# Patient Record
Sex: MALE | Race: WHITE | Employment: FULL TIME | URBAN - METROPOLITAN AREA
[De-identification: names, ages, dates, MRNs, and addresses within clinical notes are randomized per-mention and may not be internally consistent; named-entity substitution may affect disease eponyms.]

---

## 2020-02-17 ENCOUNTER — HOSPITAL ENCOUNTER (EMERGENCY)
Age: 65
Discharge: HOME | End: 2020-02-17
Attending: EMERGENCY MEDICINE
Payer: COMMERCIAL

## 2020-02-17 VITALS
BODY MASS INDEX: 26.03 KG/M2 | HEIGHT: 66 IN | TEMPERATURE: 97.8 F | DIASTOLIC BLOOD PRESSURE: 90 MMHG | WEIGHT: 162 LBS | OXYGEN SATURATION: 96 % | HEART RATE: 87 BPM | SYSTOLIC BLOOD PRESSURE: 131 MMHG | RESPIRATION RATE: 18 BRPM

## 2020-02-17 PROCEDURE — 99282 EMERGENCY DEPT VISIT SF MDM: CPT

## 2020-02-17 PROCEDURE — 6370000000 HC RX 637 (ALT 250 FOR IP): Performed by: STUDENT IN AN ORGANIZED HEALTH CARE EDUCATION/TRAINING PROGRAM

## 2020-02-17 RX ORDER — DOXYCYCLINE HYCLATE 100 MG
100 TABLET ORAL ONCE
Status: COMPLETED | OUTPATIENT
Start: 2020-02-17 | End: 2020-02-17

## 2020-02-17 RX ORDER — DOXYCYCLINE 100 MG/1
100 TABLET ORAL 2 TIMES DAILY
Qty: 28 TABLET | Refills: 0 | Status: SHIPPED | OUTPATIENT
Start: 2020-02-17 | End: 2020-03-02

## 2020-02-17 RX ADMIN — DOXYCYCLINE HYCLATE 100 MG: 100 TABLET, COATED ORAL at 15:07

## 2020-02-17 ASSESSMENT — PAIN DESCRIPTION - PROGRESSION: CLINICAL_PROGRESSION: NOT CHANGED

## 2020-02-17 ASSESSMENT — PAIN DESCRIPTION - PAIN TYPE: TYPE: ACUTE PAIN

## 2020-02-17 ASSESSMENT — PAIN DESCRIPTION - FREQUENCY: FREQUENCY: INTERMITTENT

## 2020-02-17 ASSESSMENT — PAIN DESCRIPTION - LOCATION: LOCATION: FACE;MOUTH

## 2020-02-17 ASSESSMENT — PAIN SCALES - GENERAL: PAINLEVEL_OUTOF10: 5

## 2020-02-17 ASSESSMENT — PAIN DESCRIPTION - DESCRIPTORS: DESCRIPTORS: ACHING;DISCOMFORT

## 2020-02-17 NOTE — ED PROVIDER NOTES
[address] [facility]  Emergency Department Encounter  Emergency Medicine Resident     Pt Name: Manuel Mcdonald. MRN: [id number]  Birthdate 1955  Date of evaluation: 2/17/20  PCP:  Megan Mcpherson MD    CHIEF COMPLAINT     No chief complaint on file. HISTORY OF PRESENT ILLNESS  (Location/Symptom, Timing/Onset, Context/Setting, Quality, Duration, Modifying Factors, Severity.)    Manuel Mcdonald is a 59 y.o. male who presents with infection of upper lip with associated swelling this is been present for the past 3 days he has been taking Keflex which she received in urgent care 2 days prior. Was sent here by his primary care doctor due to concern for this infection. Denies any vision changes, hearing changes, altered sensorium, difficulty swallowing trouble breathing chest pain or changes in voice. Denies any fever. He has had a pimple on his top lip that he has been scratching and is crusted over and a day after that he noticed that his lip that started swelling had been spreading to the left lip. No pain or itching.           PAST MEDICAL / SURGICAL / SOCIAL / FAMILY HISTORY   Denies PMH and PSH     Social History     Socioeconomic History    Marital status:      Spouse name: Not on file    Number of children: Not on file    Years of education: Not on file    Highest education level: Not on file   Occupational History    Not on file   Social Needs    Financial resource strain: Not on file    Food insecurity:     Worry: Not on file     Inability: Not on file    Transportation needs:     Medical: Not on file     Non-medical: Not on file   Tobacco Use    Smoking status: Never Smoker    Smokeless tobacco: Never Used   Substance and Sexual Activity    Alcohol use: Not Currently    Drug use: Never    Sexual activity: Yes     Partners: Female   Lifestyle    Physical activity:     Days per week: Not on file     Minutes per session: Not on file    Stress: Not on file   Relationships  Social connections:     Talks on phone: Not on file     Gets together: Not on file     Attends [employer] service: Not on file     Active member of club or organization: Not on file     Attends meetings of clubs or organizations: Not on file     Relationship status: Not on file    Intimate partner violence:     Fear of current or ex partner: Not on file     Emotionally abused: Not on file     Physically abused: Not on file     Forced sexual activity: Not on file   Other Topics Concern    Not on file   Social History Narrative    Not on file       History reviewed. No pertinent family history. Allergies:    Patient has no allergy information on record. Home Medications:  Prior to Admission medications    Not on File       REVIEW OF SYSTEMS    (2-9 systems for level 4, 10 or more for level 5)      Constitutional : - fever , - chills, - weight change  HENT: - hearing loss , - rhinorrhea , - tinnitus , - trouble swallowing , - voice change  Eyes: - photophobia , - visual disturbance  Resp: - cough , - shortness of breath   Cardio: - chest pain , - leg swelling , - palpitations  GI: - nausea, - vomiting, - abd pain , - black/bloody BMs, - constipation , - diarrhea   Endocrine: - heat intolerance , - cold intolerance  : - dysuria, - frequency, - hematuria   MSK: - back pain , - neck pain   ALG: - food allergies  Neuro: - dizziness ,  - headache, - weakness, - syncope  Skin: - wound , + rash   Heme: - bruise easily  Psych: - agitation , - confusion      PHYSICAL EXAM   (up to 7 for level 4, 8 or more for level 5)     INITIAL VITALS:  height is 5' 6\" (1.676 m) and weight is 162 lb (73.5 kg). His oral temperature is 97.8 °F (36.6 °C). His blood pressure is 131/90 (abnormal) and his pulse is 87. His respiration is 18 and oxygen saturation is 96%. Physical Exam  GENERAL: upon initial examination, patient is well appearing, nontoxic, and not in acute respiratory distress.   There is gross swelling of the left superior lip with associated yellow crusting on top of than erythematous base in the philtrum. No oral pharyngeal edema, woodiness, or tongue elevation. No obvious abscesses or intraoral lesions. No trismus. Uvula is midline. No tenderness palpation over bilateral TMJ. No tenderness to palpation over bilateral temporal arteries. No tonsillar pillar injection or exudations. No crepitus. No hot potato voice or dysarthria. Nonstridulous. HENT: normocephalic , nose normal,   EYES: no occular discharge, no scleral icterus  NECK: no JVD, no tracheal deviation  CV: Normal S1 S2, no MRG  PULM / CHEST: CTA Bilaterally all fields, no WRR  PSYCH / BEHAVIORAL: mood/affect normal, behavior normal, no flight of ideas      DIFFERENTIAL  DIAGNOSIS/ MDM   PLAN (LABS / IMAGING / EKG):  No orders of the defined types were placed in this encounter. MEDICATIONS ORDERED:  No orders of the defined types were placed in this encounter. DDX:       MDM:    Howie Chin is a 59 y.o. male who presents with lip infection overall impression appears to be consistent with cellulitis will provide with doxycycline discharge. Aboutely no induction of AW compromise. EMERGENCY DEPARTMENT COURSE:         DIAGNOSTIC RESULTS / EMERGENCYDEPARTMENT COURSE   LABS:  Labs Reviewed - No data to display    RADIOLOGY:  No results found. CONSULTS:  None    CRITICAL CARE:  Please see attending note    FINAL IMPRESSION     1. Cellulitis of mouth          DISPOSITION / PLAN   DISPOSITION Decision To Discharge 02/17/2020 02:43:36 PM       Evaluation and treatment course in the ED, and plan of care upon discharge was discussed in length with the patient. Patient had no further questions prior to being discharged and was instructed to return to the ED for new or worsening symptoms.   Any changes to existing medications or new prescriptions were reviewed with patient and they expressed understanding of how to correctly take their